# Patient Record
Sex: FEMALE | Race: WHITE | NOT HISPANIC OR LATINO | URBAN - METROPOLITAN AREA
[De-identification: names, ages, dates, MRNs, and addresses within clinical notes are randomized per-mention and may not be internally consistent; named-entity substitution may affect disease eponyms.]

---

## 2020-10-30 ENCOUNTER — WEB ENCOUNTER (OUTPATIENT)
Dept: URBAN - METROPOLITAN AREA CLINIC 19 | Facility: CLINIC | Age: 65
End: 2020-10-30

## 2020-10-30 ENCOUNTER — TELEPHONE ENCOUNTER (OUTPATIENT)
Dept: URBAN - METROPOLITAN AREA CLINIC 19 | Facility: CLINIC | Age: 65
End: 2020-10-30

## 2020-10-30 ENCOUNTER — OFFICE VISIT (OUTPATIENT)
Dept: URBAN - METROPOLITAN AREA CLINIC 19 | Facility: CLINIC | Age: 65
End: 2020-10-30
Payer: MEDICARE

## 2020-10-30 DIAGNOSIS — R05 CHRONIC COUGH: ICD-10-CM

## 2020-10-30 DIAGNOSIS — K21.9 GASTROESOPHAGEAL REFLUX DISEASE WITHOUT ESOPHAGITIS: ICD-10-CM

## 2020-10-30 PROCEDURE — G9903 PT SCRN TBCO ID AS NON USER: HCPCS | Performed by: INTERNAL MEDICINE

## 2020-10-30 PROCEDURE — 99213 OFFICE O/P EST LOW 20 MIN: CPT | Performed by: INTERNAL MEDICINE

## 2020-10-30 PROCEDURE — 3017F COLORECTAL CA SCREEN DOC REV: CPT | Performed by: INTERNAL MEDICINE

## 2020-10-30 PROCEDURE — G8484 FLU IMMUNIZE NO ADMIN: HCPCS | Performed by: INTERNAL MEDICINE

## 2020-10-30 PROCEDURE — G8420 CALC BMI NORM PARAMETERS: HCPCS | Performed by: INTERNAL MEDICINE

## 2020-10-30 PROCEDURE — G8427 DOCREV CUR MEDS BY ELIG CLIN: HCPCS | Performed by: INTERNAL MEDICINE

## 2020-10-30 RX ORDER — DEXLANSOPRAZOLE 60 MG/1
1 CAPSULE CAPSULE, DELAYED RELEASE ORAL ONCE A DAY
Qty: 90 CAPSULE | Refills: 3 | OUTPATIENT
Start: 2020-10-30

## 2020-10-30 NOTE — HPI-TODAY'S VISIT:
10/31/18 (Dr. Noelle Christiansen):  64 yo  woman presents for follow up. She has history of chronic cough and has had pulmonary evaulations including bronchoscopy. She also has diagnoses of asthma and allergies. She has taken immunotherapy but has significant allergies. She also uses rescue inhalers. She has had persistent cough. She had EGD which was essentially negative and only mild inflammation noted on biopsies. No celiac disease. No H pylori. She also had negative colonoscopy at the same time as her EGD on 10/19/18. She has persistent coughing for 10 years. She has had mild reflux symptoms which have been helped with Dexilant but she had insurance problems with it. She has tried a number of PPIs including esomeprazole, lansoprazole, omeprazole, pantoprazole in the past. No weight loss. No hemopytsis. No rectal bleeding. No change in bowel habits. No anemia. No melena. No fever or chills. She has shortness of breath when she is coughing.  10/30/20:  Patient presents to me today for evaluation of chronic cough.  She states that she her cough gets worse after eating and lying down.  She additionally has wheezing and a burning in her chest along with the hacking cough.

## 2020-11-09 ENCOUNTER — OFFICE VISIT (OUTPATIENT)
Dept: URBAN - METROPOLITAN AREA LAB 2 | Facility: LAB | Age: 65
End: 2020-11-09

## 2021-07-19 ENCOUNTER — OFFICE VISIT (OUTPATIENT)
Dept: URBAN - METROPOLITAN AREA CLINIC 23 | Facility: CLINIC | Age: 66
End: 2021-07-19

## 2021-09-01 ENCOUNTER — OFFICE VISIT (OUTPATIENT)
Dept: URBAN - METROPOLITAN AREA TELEHEALTH 2 | Facility: TELEHEALTH | Age: 66
End: 2021-09-01
Payer: MEDICARE

## 2021-09-01 ENCOUNTER — TELEPHONE ENCOUNTER (OUTPATIENT)
Dept: URBAN - METROPOLITAN AREA CLINIC 12 | Facility: CLINIC | Age: 66
End: 2021-09-01

## 2021-09-01 DIAGNOSIS — Z83.71 FAMILY HISTORY OF COLON POLYPS: ICD-10-CM

## 2021-09-01 DIAGNOSIS — R05 COUGH: ICD-10-CM

## 2021-09-01 DIAGNOSIS — K21.9 GERD: ICD-10-CM

## 2021-09-01 PROBLEM — 429969003 FAMILY HISTORY OF POLYP OF COLON: Status: ACTIVE | Noted: 2021-09-01

## 2021-09-01 PROCEDURE — 99213 OFFICE O/P EST LOW 20 MIN: CPT | Performed by: INTERNAL MEDICINE

## 2021-09-01 RX ORDER — DEXLANSOPRAZOLE 60 MG/1
1 CAPSULE CAPSULE, DELAYED RELEASE ORAL ONCE A DAY
Qty: 90 CAPSULE | Refills: 3 | Status: ACTIVE | COMMUNITY
Start: 2020-10-30

## 2021-09-01 RX ORDER — DEXLANSOPRAZOLE 60 MG/1
1 CAPSULE CAPSULE, DELAYED RELEASE ORAL ONCE A DAY
Qty: 90 | Refills: 0 | OUTPATIENT
Start: 2021-09-01

## 2021-09-01 NOTE — HPI-TODAY'S VISIT:
67 yo female last seen by Dr. Christiansen in 2018 -she has been seen previously for ongoing cough- at that time she was given dexilant samples at the last visit.  was advised to address the asthma,  -had endoscopy in 2018 which showed minimal inflammation -seeing pulmonologist - is on   multiple inhalers ,states that it has not improved with this -states that the cough is worsening and is not managable  -when she eats she coughs, drinks tea triggers the cough, also worse at night -she recalls that she did try dexilant but not for a long and insurance may not have covered this  -she is having burning in the chest

## 2022-05-19 ENCOUNTER — LAB OUTSIDE AN ENCOUNTER (OUTPATIENT)
Dept: URBAN - METROPOLITAN AREA CLINIC 19 | Facility: CLINIC | Age: 67
End: 2022-05-19

## 2022-05-19 ENCOUNTER — OFFICE VISIT (OUTPATIENT)
Dept: URBAN - METROPOLITAN AREA CLINIC 19 | Facility: CLINIC | Age: 67
End: 2022-05-19
Payer: MEDICARE

## 2022-05-19 DIAGNOSIS — K21.9 GASTROESOPHAGEAL REFLUX DISEASE, UNSPECIFIED WHETHER ESOPHAGITIS PRESENT: ICD-10-CM

## 2022-05-19 DIAGNOSIS — Z12.11 ENCOUNTER FOR SCREENING COLONOSCOPY FOR NON-HIGH-RISK PATIENT: ICD-10-CM

## 2022-05-19 PROBLEM — 305058001: Status: ACTIVE | Noted: 2022-05-19

## 2022-05-19 PROCEDURE — 99204 OFFICE O/P NEW MOD 45 MIN: CPT | Performed by: STUDENT IN AN ORGANIZED HEALTH CARE EDUCATION/TRAINING PROGRAM

## 2022-05-19 RX ORDER — PANTOPRAZOLE SODIUM 40 MG/1
1 TABLET TABLET, DELAYED RELEASE ORAL ONCE A DAY
Qty: 30 TABLET | Refills: 3 | OUTPATIENT
Start: 2022-05-19

## 2022-05-19 RX ORDER — FAMOTIDINE 40 MG/1
1 TABLET AT BEDTIME TABLET, FILM COATED ORAL ONCE A DAY
Qty: 30 | Refills: 2 | OUTPATIENT
Start: 2022-05-19

## 2022-05-19 RX ORDER — DEXLANSOPRAZOLE 60 MG/1
1 CAPSULE CAPSULE, DELAYED RELEASE ORAL ONCE A DAY
Qty: 90 | Refills: 0 | Status: DISCONTINUED | COMMUNITY
Start: 2021-09-01

## 2022-05-19 RX ORDER — DEXLANSOPRAZOLE 60 MG/1
1 CAPSULE CAPSULE, DELAYED RELEASE ORAL ONCE A DAY
Qty: 90 CAPSULE | Refills: 3 | Status: DISCONTINUED | COMMUNITY
Start: 2020-10-30

## 2022-05-19 RX ORDER — SODIUM SULFATE, MAGNESIUM SULFATE, AND POTASSIUM CHLORIDE 17.75; 2.7; 2.25 G/1; G/1; G/1
12 TABLETS TABLET ORAL
Qty: 24 TABLETS | Refills: 0 | OUTPATIENT
Start: 2022-05-19 | End: 2022-05-20

## 2022-05-19 NOTE — HPI-TODAY'S VISIT:
The pt is a 68 yo  F who presents for concerns of chronic cough.  Has been to a pulmonologist, ENT.  Symptoms ongoing for Several years, typically worsening only after she moved to [facility] Symptoms  Epigastric pain, cough, nocturnal, coughing spasms, dexilant, burning pain, wakes up at night. Voice hoarseness. Medications tried Dexilant, omeprazole, protonix, lifesavers, regular and rescue inhalers.   Medications that helped possibly protonix H. pylori testing - via bx during EGD and it was negative. Prior endoscopy Dr. Noelle Christiansen in 2018 with recommended 5 year follow up.   Smoking hx Never Other recreational drugs None. FH of luminal GI cancers None.  Mom - lung cancer/was a smoker.  Getting  cardiac workup from Dr. Everton Joe/ stress test.

## 2022-09-06 ENCOUNTER — ERX REFILL RESPONSE (OUTPATIENT)
Dept: URBAN - METROPOLITAN AREA CLINIC 19 | Facility: CLINIC | Age: 67
End: 2022-09-06

## 2022-09-06 RX ORDER — FAMOTIDINE 40 MG/1
1 TABLET AT BEDTIME TABLET, FILM COATED ORAL ONCE A DAY
Qty: 30 | Refills: 2 | OUTPATIENT

## 2022-09-06 RX ORDER — FAMOTIDINE 40 MG/1
TAKE ONE TABLET BY MOUTH AT BEDTIME TABLET, FILM COATED ORAL
Qty: 30 TABLET | Refills: 2 | OUTPATIENT

## 2022-10-10 ENCOUNTER — ERX REFILL RESPONSE (OUTPATIENT)
Dept: URBAN - METROPOLITAN AREA CLINIC 19 | Facility: CLINIC | Age: 67
End: 2022-10-10

## 2022-10-10 RX ORDER — PANTOPRAZOLE SODIUM 40 MG/1
1 TABLET TABLET, DELAYED RELEASE ORAL ONCE A DAY
Qty: 30 TABLET | Refills: 3 | OUTPATIENT

## 2022-10-10 RX ORDER — PANTOPRAZOLE 40 MG/1
TAKE ONE TABLET BY MOUTH DAILY TABLET, DELAYED RELEASE ORAL
Qty: 30 TABLET | Refills: 0 | OUTPATIENT

## 2022-10-17 ENCOUNTER — TELEPHONE ENCOUNTER (OUTPATIENT)
Dept: URBAN - METROPOLITAN AREA CLINIC 19 | Facility: CLINIC | Age: 67
End: 2022-10-17

## 2022-10-18 PROBLEM — 235595009 GASTROESOPHAGEAL REFLUX DISEASE: Status: ACTIVE | Noted: 2022-05-19

## 2022-10-25 ENCOUNTER — TELEPHONE ENCOUNTER (OUTPATIENT)
Dept: URBAN - METROPOLITAN AREA CLINIC 19 | Facility: CLINIC | Age: 67
End: 2022-10-25

## 2022-10-25 ENCOUNTER — OFFICE VISIT (OUTPATIENT)
Dept: URBAN - METROPOLITAN AREA SURGERY CENTER 31 | Facility: SURGERY CENTER | Age: 67
End: 2022-10-25
Payer: MEDICARE

## 2022-10-25 ENCOUNTER — LAB OUTSIDE AN ENCOUNTER (OUTPATIENT)
Dept: URBAN - METROPOLITAN AREA CLINIC 19 | Facility: CLINIC | Age: 67
End: 2022-10-25

## 2022-10-25 ENCOUNTER — CLAIMS CREATED FROM THE CLAIM WINDOW (OUTPATIENT)
Dept: URBAN - METROPOLITAN AREA CLINIC 4 | Facility: CLINIC | Age: 67
End: 2022-10-25
Payer: MEDICARE

## 2022-10-25 DIAGNOSIS — R05.3 CHRONIC COUGH: ICD-10-CM

## 2022-10-25 DIAGNOSIS — K31.89 ACQUIRED DEFORMITY OF DUODENUM: ICD-10-CM

## 2022-10-25 DIAGNOSIS — K29.70 GASTRITIS, UNSPECIFIED, WITHOUT BLEEDING: ICD-10-CM

## 2022-10-25 DIAGNOSIS — K31.7 BENIGN GASTRIC POLYP: ICD-10-CM

## 2022-10-25 DIAGNOSIS — K22.89 DILATATION OF ESOPHAGUS: ICD-10-CM

## 2022-10-25 PROBLEM — 98641000119100: Status: ACTIVE | Noted: 2022-10-25

## 2022-10-25 PROCEDURE — 43239 EGD BIOPSY SINGLE/MULTIPLE: CPT | Performed by: STUDENT IN AN ORGANIZED HEALTH CARE EDUCATION/TRAINING PROGRAM

## 2022-10-25 PROCEDURE — 88312 SPECIAL STAINS GROUP 1: CPT | Performed by: PATHOLOGY

## 2022-10-25 PROCEDURE — G8907 PT DOC NO EVENTS ON DISCHARG: HCPCS | Performed by: STUDENT IN AN ORGANIZED HEALTH CARE EDUCATION/TRAINING PROGRAM

## 2022-10-25 PROCEDURE — 88305 TISSUE EXAM BY PATHOLOGIST: CPT | Performed by: PATHOLOGY

## 2022-10-25 RX ORDER — PANTOPRAZOLE 40 MG/1
TAKE ONE TABLET BY MOUTH DAILY TABLET, DELAYED RELEASE ORAL
Qty: 30 TABLET | Refills: 0 | Status: ACTIVE | COMMUNITY

## 2022-10-25 RX ORDER — FAMOTIDINE 40 MG/1
TAKE ONE TABLET BY MOUTH AT BEDTIME TABLET, FILM COATED ORAL
Qty: 30 TABLET | Refills: 2 | Status: ACTIVE | COMMUNITY

## 2022-10-28 ENCOUNTER — OFFICE VISIT (OUTPATIENT)
Dept: URBAN - METROPOLITAN AREA SURGERY CENTER 31 | Facility: SURGERY CENTER | Age: 67
End: 2022-10-28

## 2022-12-05 ENCOUNTER — ERX REFILL RESPONSE (OUTPATIENT)
Dept: URBAN - METROPOLITAN AREA CLINIC 19 | Facility: CLINIC | Age: 67
End: 2022-12-05

## 2022-12-05 RX ORDER — FAMOTIDINE 40 MG/1
TAKE ONE TABLET BY MOUTH AT BEDTIME TABLET, FILM COATED ORAL
Qty: 30 TABLET | Refills: 3 | OUTPATIENT

## 2022-12-05 RX ORDER — FAMOTIDINE 40 MG/1
TAKE ONE TABLET BY MOUTH AT BEDTIME TABLET, FILM COATED ORAL
Qty: 30 TABLET | Refills: 0 | OUTPATIENT

## 2022-12-12 ENCOUNTER — OFFICE VISIT (OUTPATIENT)
Dept: URBAN - METROPOLITAN AREA SURGERY CENTER 31 | Facility: SURGERY CENTER | Age: 67
End: 2022-12-12

## 2022-12-15 ENCOUNTER — CLAIMS CREATED FROM THE CLAIM WINDOW (OUTPATIENT)
Dept: URBAN - METROPOLITAN AREA CLINIC 19 | Facility: CLINIC | Age: 67
End: 2022-12-15
Payer: MEDICARE

## 2022-12-15 ENCOUNTER — DASHBOARD ENCOUNTERS (OUTPATIENT)
Age: 67
End: 2022-12-15

## 2022-12-15 VITALS
HEIGHT: 66 IN | DIASTOLIC BLOOD PRESSURE: 80 MMHG | SYSTOLIC BLOOD PRESSURE: 121 MMHG | BODY MASS INDEX: 23.95 KG/M2 | TEMPERATURE: 98.4 F | WEIGHT: 149 LBS

## 2022-12-15 DIAGNOSIS — K21.9 GASTROESOPHAGEAL REFLUX DISEASE WITHOUT ESOPHAGITIS: ICD-10-CM

## 2022-12-15 DIAGNOSIS — R05 CHRONIC COUGH: ICD-10-CM

## 2022-12-15 DIAGNOSIS — R05 COUGH: ICD-10-CM

## 2022-12-15 PROCEDURE — 99213 OFFICE O/P EST LOW 20 MIN: CPT | Performed by: STUDENT IN AN ORGANIZED HEALTH CARE EDUCATION/TRAINING PROGRAM

## 2022-12-15 RX ORDER — NORTRIPTYLINE HYDROCHLORIDE 25 MG/1
1 CAPSULE CAPSULE ORAL ONCE A DAY
Qty: 30 | Refills: 2 | OUTPATIENT
Start: 2022-12-15

## 2022-12-15 RX ORDER — PANTOPRAZOLE 40 MG/1
TAKE ONE TABLET BY MOUTH DAILY TABLET, DELAYED RELEASE ORAL
Qty: 30 TABLET | Refills: 0 | Status: ACTIVE | COMMUNITY

## 2022-12-15 RX ORDER — FAMOTIDINE 40 MG/1
TAKE ONE TABLET BY MOUTH AT BEDTIME TABLET, FILM COATED ORAL
Qty: 30 TABLET | Refills: 3 | Status: ACTIVE | COMMUNITY

## 2022-12-15 NOTE — HPI-TODAY'S VISIT:
12/15/2022: Gerry: The pt is a 68 yo F here for follow up.  Continues to have concerns for cough.  Significant amount of pyrosis, cough and chest discomfort with post-nasal drip.  Says she has asthma has been bad and she cannot breath because of the cough and burning pain.  We discussed the results from the procedure as she had trouble accessing her account online.  I also made sure she had a copy of the path report and the letter available for her via a hard copy.    We discussed that the findings on EGD  that was performed on 10/25/2022 did not show any esophageal histopathological abnormalities including any Barretts.  Biospies of the mucosal abnormalities that I noted, the white flecks did not turn out to be of any significance.  Gastric biospies showed chemical gastritis, likely from bile acid reflux.  The duodenum was normal.  She did not have any significant evidence of reflux disease on visual inspection.  She also has not manifested any degree of response to the usual reflux medications.  This points to the obvious conclusion that her disease is not reflux related.  Otherwise it would not be repeatedly resistent over several years to reflux medication without any response to it.  In addition her mainfestation is cough and post-nasal drip, which is also not a typical reflux symptom but a pulmonary symptom and at best a possible extra-intestinal manifestation of reflux, if supported by other evidence of reflux symptoms.  In her case, there is no other symptom other than cough to manifest it.    Of note, the patient also had a paroxysm of cough when waking up in recovery after her EGD procedure, due to her astham, and her not taking her inhaler.  This leads me to believe that her condition has more to do with pulmonary issues rather than GI issues and with each upper endoscopic investigation, she is exposing herself unnecessarily to respiratory complications for a futile investigation. We discussed pH monitoring and the patient told me that she would not be able to tolerate this test.   5/19/2022:  Gerry: The pt is a 68 yo  F who presents for concerns of chronic cough.  Has been to a pulmonologist, ENT.  Symptoms ongoing for Several years, typically worsening only after she moved to [facility] Symptoms  Epigastric pain, cough, nocturnal, coughing spasms, dexilant, burning pain, wakes up at night. Voice hoarseness. Medications tried Dexilant, omeprazole, protonix, lifesavers, regular and rescue inhalers.   Medications that helped possibly protonix H. pylori testing - via bx during EGD and it was negative. Prior endoscopy Dr. Noelle Christiansen in 2018 with recommended 5 year follow up.   Smoking hx Never Other recreational drugs None. FH of luminal GI cancers None.  Mom - lung cancer/was a smoker.  Getting  cardiac workup from Dr. Everton Joe/ stress test.

## 2022-12-30 ENCOUNTER — TELEPHONE ENCOUNTER (OUTPATIENT)
Dept: URBAN - METROPOLITAN AREA CLINIC 19 | Facility: CLINIC | Age: 67
End: 2022-12-30

## 2022-12-30 RX ORDER — FAMOTIDINE 40 MG/1
TAKE ONE TABLET BY MOUTH AT BEDTIME TABLET, FILM COATED ORAL TWICE A DAY
Qty: 60 | Refills: 3

## 2023-05-29 ENCOUNTER — ERX REFILL RESPONSE (OUTPATIENT)
Dept: URBAN - METROPOLITAN AREA CLINIC 19 | Facility: CLINIC | Age: 68
End: 2023-05-29

## 2023-05-29 RX ORDER — FAMOTIDINE 40 MG/1
TAKE ONE TABLET BY MOUTH TWICE A DAY (ONE DOSE AT BEDTIME) TABLET, FILM COATED ORAL TWICE A DAY
Qty: 60 TABLET | Refills: 0 | OUTPATIENT

## 2023-05-29 RX ORDER — FAMOTIDINE 40 MG/1
TAKE ONE TABLET BY MOUTH TWICE A DAY (ONE DOSE AT BEDTIME) TABLET, FILM COATED ORAL
Qty: 60 TABLET | Refills: 3 | OUTPATIENT

## 2023-10-17 ENCOUNTER — ERX REFILL RESPONSE (OUTPATIENT)
Dept: URBAN - METROPOLITAN AREA CLINIC 19 | Facility: CLINIC | Age: 68
End: 2023-10-17

## 2023-10-17 RX ORDER — FAMOTIDINE 40 MG/1
TAKE 1 TABLET BY MOUTH TWICE A DAY ( ONE DOSE AT BEDTIME ) TABLET, FILM COATED ORAL
Qty: 60 TABLET | Refills: 5 | OUTPATIENT

## 2023-10-17 RX ORDER — FAMOTIDINE 40 MG/1
TAKE 1 TABLET BY MOUTH TWICE A DAY ( ONE DOSE AT BEDTIME ) TABLET, FILM COATED ORAL
Qty: 60 TABLET | Refills: 0 | OUTPATIENT

## 2024-06-24 ENCOUNTER — ERX REFILL RESPONSE (OUTPATIENT)
Dept: URBAN - METROPOLITAN AREA CLINIC 19 | Facility: CLINIC | Age: 69
End: 2024-06-24

## 2024-06-24 RX ORDER — FAMOTIDINE 40 MG/1
TAKE 1 TABLET BY MOUTH TWICE A DAY ( ONE DOSE AT BEDTIME ) TABLET, FILM COATED ORAL
Qty: 60 TABLET | Refills: 5 | OUTPATIENT

## 2024-06-24 RX ORDER — FAMOTIDINE 40 MG/1
TAKE 1 TABLET BY MOUTH TWICE A DAY (ONE DOSE AT BEDTIME) TABLET, FILM COATED ORAL
Qty: 60 TABLET | Refills: 5 | OUTPATIENT

## 2024-09-19 ENCOUNTER — OFFICE VISIT (OUTPATIENT)
Dept: URBAN - METROPOLITAN AREA CLINIC 19 | Facility: CLINIC | Age: 69
End: 2024-09-19

## 2024-09-19 ENCOUNTER — OFFICE VISIT (OUTPATIENT)
Dept: URBAN - METROPOLITAN AREA CLINIC 19 | Facility: CLINIC | Age: 69
End: 2024-09-19
Payer: MEDICARE

## 2024-09-19 VITALS
TEMPERATURE: 98.1 F | OXYGEN SATURATION: 95 % | BODY MASS INDEX: 22.95 KG/M2 | SYSTOLIC BLOOD PRESSURE: 108 MMHG | WEIGHT: 142.8 LBS | HEIGHT: 66 IN | HEART RATE: 90 BPM | DIASTOLIC BLOOD PRESSURE: 72 MMHG

## 2024-09-19 DIAGNOSIS — R05.3 UNEXPLAINED CHRONIC COUGH: ICD-10-CM

## 2024-09-19 DIAGNOSIS — K59.09 CHRONIC CONSTIPATION: ICD-10-CM

## 2024-09-19 DIAGNOSIS — K21.9 GASTROESOPHAGEAL REFLUX IN INFANTS: ICD-10-CM

## 2024-09-19 PROCEDURE — 99204 OFFICE O/P NEW MOD 45 MIN: CPT | Performed by: STUDENT IN AN ORGANIZED HEALTH CARE EDUCATION/TRAINING PROGRAM

## 2024-09-19 RX ORDER — NORTRIPTYLINE HYDROCHLORIDE 25 MG/1
1 CAPSULE CAPSULE ORAL ONCE A DAY
Qty: 30 | Refills: 2 | Status: ON HOLD | COMMUNITY
Start: 2022-12-15

## 2024-09-19 RX ORDER — PANTOPRAZOLE 40 MG/1
TAKE ONE TABLET BY MOUTH DAILY TABLET, DELAYED RELEASE ORAL
Qty: 30 TABLET | Refills: 0 | Status: ON HOLD | COMMUNITY

## 2024-09-19 RX ORDER — FAMOTIDINE 40 MG/1
TAKE 1 TABLET BY MOUTH TWICE A DAY (ONE DOSE AT BEDTIME) TABLET, FILM COATED ORAL
Qty: 60 TABLET | Refills: 5 | Status: ACTIVE | COMMUNITY

## 2024-09-19 NOTE — HPI-OTHER HISTORIES
12/15/2022: Gerry: The pt is a 66 yo F here for follow up. Continues to have concerns for cough. Significant amount of pyrosis, cough and chest discomfort with post-nasal drip. Says she has asthma has been bad and she cannot breath because of the cough and burning pain. We discussed the results from the procedure as she had trouble accessing her account online. I also made sure she had a copy of the path report and the letter available for her via a hard copy.  We discussed that the findings on EGD that was performed on 10/25/2022 did not show any esophageal histopathological abnormalities including any Barretts. Biospies of the mucosal abnormalities that I noted, the white flecks did not turn out to be of any significance. Gastric biospies showed chemical gastritis, likely from bile acid reflux. The duodenum was normal. She did not have any significant evidence of reflux disease on visual inspection. She also has not manifested any degree of response to the usual reflux medications. This points to the obvious conclusion that her disease is not reflux related. Otherwise it would not be repeatedly resistent over several years to reflux medication without any response to it. In addition her mainfestation is cough and post-nasal drip, which is also not a typical reflux symptom but a pulmonary symptom and at best a possible extra-intestinal manifestation of reflux, if supported by other evidence of reflux symptoms. In her case, there is no other symptom other than cough to manifest it. Of note, the patient also had a paroxysm of cough when waking up in recovery after her EGD procedure, due to her astham, and her not taking her inhaler. This leads me to believe that her condition has more to do with pulmonary issues rather than GI issues and with each upper endoscopic investigation, she is exposing herself unnecessarily to respiratory complications for a futile investigation. We discussed pH monitoring and the patient told me that she would not be able to tolerate this test.   5/19/2022: eGrry: The pt is a 66 yo F who presents for concerns of chronic cough. Has been to a pulmonologist, ENT.  Symptoms ongoing for Several years, typically worsening only after she moved to [facility] Symptoms Epigastric pain, cough, nocturnal, coughing spasms, dexilant, burning pain, wakes up at night. Voice hoarseness. Medications tried Dexilant, omeprazole, protonix, lifesavers, regular and rescue inhalers. Medications that helped possibly protonix H. pylori testing - via bx during EGD and it was negative. Prior endoscopy Dr. Noelle Christiansen in 2018 with recommended 5 year follow up. Smoking hx Never Other recreational drugs None. FH of luminal GI cancers None. Mom - lung cancer/was a smoker.  Getting cardiac workup from Dr. Everton Joe/ stress test.

## 2024-09-19 NOTE — HPI-TODAY'S VISIT:
9/19/2024:  Gerry: 70 yo F with chronic cough and post-nasal drip, with asthma, who was previously seen for these symptoms and underwent EGD in Dec 2022 with paroxysmal coughing noted in PACU after procedure is being seen for the following concerns. Left sided pain and bloating.  Abdominal X-ray was ordered by her PCP.  Findings include gas/stool in the colon and rectum.  Pelvic pleboliths.  Also noted was soft tissue density right mid abdomen.  Recommended sonogram or CT to rule out soft tissue mass.  It has been scheduled. Constipation.  Not taking anything regularly.  Still has cough.  Has been on omeprazole recently along with famotidine.  PPI and H2B has not helped in the past.  Has recently started seeing Dr. Adalgisa Draper.

## 2024-09-23 ENCOUNTER — LAB OUTSIDE AN ENCOUNTER (OUTPATIENT)
Dept: URBAN - METROPOLITAN AREA CLINIC 19 | Facility: CLINIC | Age: 69
End: 2024-09-23

## 2024-09-24 LAB
CREATININE POC: 0.7
PERFORMING LAB: (no result)

## 2024-09-30 ENCOUNTER — TELEPHONE ENCOUNTER (OUTPATIENT)
Dept: URBAN - METROPOLITAN AREA CLINIC 23 | Facility: CLINIC | Age: 69
End: 2024-09-30

## 2024-11-01 ENCOUNTER — OFFICE VISIT (OUTPATIENT)
Dept: URBAN - METROPOLITAN AREA SURGERY CENTER 31 | Facility: SURGERY CENTER | Age: 69
End: 2024-11-01

## 2024-11-12 ENCOUNTER — TELEPHONE ENCOUNTER (OUTPATIENT)
Dept: URBAN - METROPOLITAN AREA CLINIC 19 | Facility: CLINIC | Age: 69
End: 2024-11-12

## 2024-11-15 ENCOUNTER — OFFICE VISIT (OUTPATIENT)
Dept: URBAN - METROPOLITAN AREA SURGERY CENTER 31 | Facility: SURGERY CENTER | Age: 69
End: 2024-11-15
Payer: MEDICARE

## 2024-11-15 ENCOUNTER — CLAIMS CREATED FROM THE CLAIM WINDOW (OUTPATIENT)
Dept: URBAN - METROPOLITAN AREA CLINIC 4 | Facility: CLINIC | Age: 69
End: 2024-11-15
Payer: MEDICARE

## 2024-11-15 DIAGNOSIS — K62.3 RECTAL PROLAPSE: ICD-10-CM

## 2024-11-15 DIAGNOSIS — K31.89 OTHER DISEASES OF STOMACH AND DUODENUM: ICD-10-CM

## 2024-11-15 DIAGNOSIS — K62.6 ANAL ULCERATION: ICD-10-CM

## 2024-11-15 DIAGNOSIS — K62.6 ANAL ULCER: ICD-10-CM

## 2024-11-15 DIAGNOSIS — K21.9 ACID REFLUX: ICD-10-CM

## 2024-11-15 DIAGNOSIS — K21.9 GASTRO-ESOPHAGEAL REFLUX DISEASE WITHOUT ESOPHAGITIS: ICD-10-CM

## 2024-11-15 DIAGNOSIS — R05.3 CHRONIC COUGH: ICD-10-CM

## 2024-11-15 DIAGNOSIS — K59.09 OTHER CONSTIPATION: ICD-10-CM

## 2024-11-15 DIAGNOSIS — K62.6 ULCER OF ANUS AND RECTUM: ICD-10-CM

## 2024-11-15 DIAGNOSIS — K64.8 OTHER HEMORRHOIDS: ICD-10-CM

## 2024-11-15 PROCEDURE — 88305 TISSUE EXAM BY PATHOLOGIST: CPT | Performed by: PATHOLOGY

## 2024-11-15 PROCEDURE — 43239 EGD BIOPSY SINGLE/MULTIPLE: CPT | Performed by: STUDENT IN AN ORGANIZED HEALTH CARE EDUCATION/TRAINING PROGRAM

## 2024-11-15 PROCEDURE — 45380 COLONOSCOPY AND BIOPSY: CPT | Performed by: STUDENT IN AN ORGANIZED HEALTH CARE EDUCATION/TRAINING PROGRAM

## 2024-11-15 PROCEDURE — 00813 ANES UPR LWR GI NDSC PX: CPT | Performed by: NURSE ANESTHETIST, CERTIFIED REGISTERED

## 2024-11-15 RX ORDER — FAMOTIDINE 40 MG/1
TAKE 1 TABLET BY MOUTH TWICE A DAY (ONE DOSE AT BEDTIME) TABLET, FILM COATED ORAL
Qty: 60 TABLET | Refills: 5 | Status: ACTIVE | COMMUNITY

## 2024-11-15 RX ORDER — PANTOPRAZOLE 40 MG/1
TAKE ONE TABLET BY MOUTH DAILY TABLET, DELAYED RELEASE ORAL
Qty: 30 TABLET | Refills: 0 | Status: ON HOLD | COMMUNITY

## 2024-11-15 RX ORDER — NORTRIPTYLINE HYDROCHLORIDE 25 MG/1
1 CAPSULE CAPSULE ORAL ONCE A DAY
Qty: 30 | Refills: 2 | Status: ON HOLD | COMMUNITY
Start: 2022-12-15

## 2025-01-30 ENCOUNTER — OFFICE VISIT (OUTPATIENT)
Dept: URBAN - METROPOLITAN AREA CLINIC 19 | Facility: CLINIC | Age: 70
End: 2025-01-30
Payer: MEDICARE

## 2025-01-30 DIAGNOSIS — K59.09 CHRONIC CONSTIPATION: ICD-10-CM

## 2025-01-30 DIAGNOSIS — K20.90 ESOPHAGITIS: ICD-10-CM

## 2025-01-30 DIAGNOSIS — R05.3 UNEXPLAINED CHRONIC COUGH: ICD-10-CM

## 2025-01-30 DIAGNOSIS — K21.9 GASTROESOPHAGEAL REFLUX IN INFANTS: ICD-10-CM

## 2025-01-30 PROBLEM — 236069009: Status: ACTIVE | Noted: 2025-01-30

## 2025-01-30 PROBLEM — 1204369007: Status: ACTIVE | Noted: 2025-01-30

## 2025-01-30 PROBLEM — 16761005: Status: ACTIVE | Noted: 2025-01-30

## 2025-01-30 PROCEDURE — 99213 OFFICE O/P EST LOW 20 MIN: CPT | Performed by: STUDENT IN AN ORGANIZED HEALTH CARE EDUCATION/TRAINING PROGRAM

## 2025-01-30 RX ORDER — PANTOPRAZOLE 40 MG/1
TAKE ONE TABLET BY MOUTH DAILY TABLET, DELAYED RELEASE ORAL
Qty: 30 TABLET | Refills: 0 | Status: ACTIVE | COMMUNITY

## 2025-01-30 RX ORDER — NORTRIPTYLINE HYDROCHLORIDE 25 MG/1
1 CAPSULE CAPSULE ORAL ONCE A DAY
Qty: 30 | Refills: 2 | Status: ON HOLD | COMMUNITY
Start: 2022-12-15

## 2025-01-30 RX ORDER — FAMOTIDINE 40 MG/1
TAKE 1 TABLET BY MOUTH TWICE A DAY (ONE DOSE AT BEDTIME) TABLET, FILM COATED ORAL
Qty: 60 TABLET | Refills: 5 | Status: ACTIVE | COMMUNITY

## 2025-01-30 NOTE — HPI-TODAY'S VISIT:
1/30/2025:  Gerry: 70 yo F with chronic constipation and GERD symptoms, with chronic cough presents for follow up. Underwent EGD/Colonoscopy in Nov 2024.  Finding consistent with reflux and rectal prolapse with solitary rectal ulcer type changes from straining.  Here for follow up.  Not currently taking PPI which had previously been prescribed to her.  She was also recommended miralax for regular bowel regimen.

## 2025-01-30 NOTE — HPI-OTHER HISTORIES
11/15/2024:  Gerry: EGD- White specks noted in the esophagus. Biospied. Reflux typc changes noted.   Gastric erythema. Biopsied.  Reflux changes noted.  Hiatal hernia.  Normal duodenum. Colonoscopy- Hemorrhoids.  Rectal erythema.  Biopsied.  Solitary ulcer type changes noted with prolapse rectal mucosa.Otherwise normal exam. 5 year recall.  9/19/2024:  Gerry: 70 yo F with chronic cough and post-nasal drip, with asthma, who was previously seen for these symptoms and underwent EGD in Dec 2022 with paroxysmal coughing noted in PACU after procedure is being seen for the following concerns. Left sided pain and bloating.  Abdominal X-ray was ordered by her PCP.  Findings include gas/stool in the colon and rectum.  Pelvic pleboliths.  Also noted was soft tissue density right mid abdomen.  Recommended sonogram or CT to rule out soft tissue mass.  It has been scheduled. Constipation.  Not taking anything regularly.  Still has cough.  Has been on omeprazole recently along with famotidine.  PPI and H2B has not helped in the past.  Has recently started seeing Dr. Adalgisa Draper. Plan: Try voquezna 10 and EGD for cough.  Miralax for constipation.  Recommend colonoscopy.  2-day prep.  CTAP  ==================  12/15/2022: Gerry: The pt is a 66 yo F here for follow up. Continues to have concerns for cough. Significant amount of pyrosis, cough and chest discomfort with post-nasal drip. Says she has asthma has been bad and she cannot breath because of the cough and burning pain. We discussed the results from the procedure as she had trouble accessing her account online. I also made sure she had a copy of the path report and the letter available for her via a hard copy.  We discussed that the findings on EGD that was performed on 10/25/2022 did not show any esophageal histopathological abnormalities including any Barretts. Biospies of the mucosal abnormalities that I noted, the white flecks did not turn out to be of any significance. Gastric biospies showed chemical gastritis, likely from bile acid reflux. The duodenum was normal. She did not have any significant evidence of reflux disease on visual inspection. She also has not manifested any degree of response to the usual reflux medications. This points to the obvious conclusion that her disease is not reflux related. Otherwise it would not be repeatedly resistent over several years to reflux medication without any response to it. In addition her mainfestation is cough and post-nasal drip, which is also not a typical reflux symptom but a pulmonary symptom and at best a possible extra-intestinal manifestation of reflux, if supported by other evidence of reflux symptoms. In her case, there is no other symptom other than cough to manifest it. Of note, the patient also had a paroxysm of cough when waking up in recovery after her EGD procedure, due to her astham, and her not taking her inhaler. This leads me to believe that her condition has more to do with pulmonary issues rather than GI issues and with each upper endoscopic investigation, she is exposing herself unnecessarily to respiratory complications for a futile investigation. We discussed pH monitoring and the patient told me that she would not be able to tolerate this test.   5/19/2022: Gerry: The pt is a 66 yo F who presents for concerns of chronic cough. Has been to a pulmonologist, ENT.  Symptoms ongoing for Several years, typically worsening only after she moved to [facility] Symptoms Epigastric pain, cough, nocturnal, coughing spasms, dexilant, burning pain, wakes up at night. Voice hoarseness. Medications tried Dexilant, omeprazole, protonix, lifesavers, regular and rescue inhalers. Medications that helped possibly protonix H. pylori testing - via bx during EGD and it was negative. Prior endoscopy Dr. Noelle Christiansen in 2018 with recommended 5 year follow up. Smoking hx Never Other recreational drugs None. FH of luminal GI cancers None. Mom - lung cancer/was a smoker.  Getting cardiac workup from Dr. Everton Joe/ stress test.

## 2025-02-28 ENCOUNTER — TELEPHONE ENCOUNTER (OUTPATIENT)
Dept: URBAN - METROPOLITAN AREA CLINIC 19 | Facility: CLINIC | Age: 70
End: 2025-02-28

## 2025-02-28 ENCOUNTER — OFFICE VISIT (OUTPATIENT)
Dept: URBAN - METROPOLITAN AREA SURGERY CENTER 31 | Facility: SURGERY CENTER | Age: 70
End: 2025-02-28

## 2025-02-28 RX ORDER — FAMOTIDINE 40 MG/1
TAKE 1 TABLET BY MOUTH TWICE A DAY (ONE DOSE AT BEDTIME) TABLET, FILM COATED ORAL
Qty: 60 TABLET | Refills: 5 | Status: ACTIVE | COMMUNITY

## 2025-02-28 RX ORDER — PANTOPRAZOLE 40 MG/1
TAKE ONE TABLET BY MOUTH DAILY TABLET, DELAYED RELEASE ORAL
Qty: 30 TABLET | Refills: 0 | Status: ACTIVE | COMMUNITY

## 2025-02-28 RX ORDER — NORTRIPTYLINE HYDROCHLORIDE 25 MG/1
1 CAPSULE CAPSULE ORAL ONCE A DAY
Qty: 30 | Refills: 2 | Status: ON HOLD | COMMUNITY
Start: 2022-12-15

## 2025-03-07 ENCOUNTER — WEB ENCOUNTER (OUTPATIENT)
Dept: URBAN - METROPOLITAN AREA CLINIC 19 | Facility: CLINIC | Age: 70
End: 2025-03-07

## 2025-03-31 ENCOUNTER — TELEPHONE ENCOUNTER (OUTPATIENT)
Dept: URBAN - METROPOLITAN AREA CLINIC 19 | Facility: CLINIC | Age: 70
End: 2025-03-31

## 2025-04-03 ENCOUNTER — OFFICE VISIT (OUTPATIENT)
Dept: URBAN - METROPOLITAN AREA CLINIC 19 | Facility: CLINIC | Age: 70
End: 2025-04-03
Payer: MEDICARE

## 2025-04-03 DIAGNOSIS — R12 HEARTBURN: ICD-10-CM

## 2025-04-03 DIAGNOSIS — K20.90 ESOPHAGITIS: ICD-10-CM

## 2025-04-03 PROCEDURE — 99214 OFFICE O/P EST MOD 30 MIN: CPT

## 2025-04-03 RX ORDER — FAMOTIDINE 40 MG/1
1 TABLET TABLET, FILM COATED ORAL ONCE A DAY
Qty: 90 TABLET | Refills: 5

## 2025-04-03 RX ORDER — FAMOTIDINE 40 MG/1
TAKE 1 TABLET BY MOUTH TWICE A DAY (ONE DOSE AT BEDTIME) TABLET, FILM COATED ORAL
Qty: 60 TABLET | Refills: 5 | Status: ACTIVE | COMMUNITY

## 2025-04-03 RX ORDER — PANTOPRAZOLE 40 MG/1
TAKE ONE TABLET BY MOUTH DAILY TABLET, DELAYED RELEASE ORAL
Qty: 30 TABLET | Refills: 0 | Status: ACTIVE | COMMUNITY

## 2025-04-03 RX ORDER — OMEPRAZOLE 40 MG/1
1 CAPSULE 1/2 TO 1 HOUR BEFORE MORNING MEAL CAPSULE, DELAYED RELEASE ORAL ONCE A DAY
Qty: 90 | Refills: 3 | OUTPATIENT
Start: 2025-04-03

## 2025-04-03 NOTE — HPI-TODAY'S VISIT:
Mrs. Rai is a 69-year-old female with chronic constipation and GERD symptoms including heartburn and chronic cough presents today for follow-up.  Last seen in clinic by Dr. Garrett on 1/30/2025 and recommended to take PPI and famotidine once daily as well as repeating EGD for evaluation of esophagitis.  2/28/2025 EGD noted mild nonerosive and nonreflux esophagitis with no bleeding.  Small hiatal hernia, a few gastric polyps and a normal examined duodenum.  Proximal and distal esophageal biopsies with no evidence of Ballesteros's, EOE, dysplasia or malignancy.  Fundic gland polyp, negative for H. pylori.  3/27/2025 barium swallow noted a small sliding hiatal hernia, moderate spontaneous gastroesophageal reflux to the midesophagus and esophageal dysmotility.  Today she reports her heartburn is worse. Currently taking omeprazole 20 mg in the morning and pepcid at night time. She does drink a large amount of tea and eats chocolate daily.  Previous procedures: 11/2024 EGD noted white speckled mucosa in the esophagus, medium size hiatal hernia, erythematous mucosa in the antrum and normal examined duodenum On the same day colonoscopy noted hemorrhoids on perianal exam, erythematous mucosa in the rectum, otherwise normal exam.  Recommended repeating in 5 years (2029).

## 2025-05-15 ENCOUNTER — OFFICE VISIT (OUTPATIENT)
Dept: URBAN - METROPOLITAN AREA CLINIC 19 | Facility: CLINIC | Age: 70
End: 2025-05-15
Payer: MEDICARE

## 2025-05-15 DIAGNOSIS — R12 HEARTBURN: ICD-10-CM

## 2025-05-15 DIAGNOSIS — R19.4 CHANGE IN BOWEL HABITS: ICD-10-CM

## 2025-05-15 DIAGNOSIS — K20.90 ESOPHAGITIS: ICD-10-CM

## 2025-05-15 DIAGNOSIS — K62.5 RECTAL BLEED: ICD-10-CM

## 2025-05-15 DIAGNOSIS — K55.9 ISCHEMIC COLON: ICD-10-CM

## 2025-05-15 PROBLEM — 88111009: Status: ACTIVE | Noted: 2025-05-15

## 2025-05-15 PROBLEM — 12063002: Status: ACTIVE | Noted: 2025-05-15

## 2025-05-15 PROBLEM — 30588004: Status: ACTIVE | Noted: 2025-05-15

## 2025-05-15 PROCEDURE — 99214 OFFICE O/P EST MOD 30 MIN: CPT | Performed by: STUDENT IN AN ORGANIZED HEALTH CARE EDUCATION/TRAINING PROGRAM

## 2025-05-15 RX ORDER — OMEPRAZOLE 40 MG/1
1 CAPSULE 1/2 TO 1 HOUR BEFORE MORNING MEAL CAPSULE, DELAYED RELEASE ORAL ONCE A DAY
Qty: 90 | Refills: 3 | Status: ACTIVE | COMMUNITY
Start: 2025-04-03

## 2025-05-15 RX ORDER — PANTOPRAZOLE 40 MG/1
TAKE ONE TABLET BY MOUTH DAILY TABLET, DELAYED RELEASE ORAL
Qty: 30 TABLET | Refills: 0 | Status: ACTIVE | COMMUNITY

## 2025-05-15 RX ORDER — FAMOTIDINE 40 MG/1
1 TABLET TABLET, FILM COATED ORAL ONCE A DAY
Qty: 90 TABLET | Refills: 5 | Status: ACTIVE | COMMUNITY

## 2025-05-15 NOTE — HPI-TODAY'S VISIT:
Falguni Rai is a 70-year-old female who reports experiencing severe stomach pain and rectal bleeding [birth year]. She describes the pain as intense, accompanied by sweating and nausea, and eventually vomiting with blood present. The bleeding continued for three days, with bright red blood visible on tissue. She denies any episodes of dehydration or lightheadedness but mentions feeling tired. Falguni is concerned about a possible prolapsed hemorrhoid, noting a hole where excrement can come out. She recalls a previous colonoscopy six months ago and wonders if the bleeding could be a sign of cancer. She has been dealing with allergies and taking medications that may dry her out, potentially affecting blood circulation to her bowels. Falguni mentions her stool consistency varies with Miralax use, sometimes dark and dry, other times healthy and brown. She is advised to adjust her Miralax dosage to maintain regular bowel movements. Falguni is informed about the possibility of ischemic colitis due to transient drops in blood pressure affecting bowel blood supply. She is advised to undergo a blood test and colonoscopy to assess bowel damage and determine if antibiotics or other treatments are needed. Falguni is also referred to Dr. Gomez, a colorectal surgeon, for further evaluation of her hemorrhoid condition.

## 2025-05-15 NOTE — HPI-OTHER HISTORIES
4/3/2025: MARIN Zhang: Mrs. Rai is a 69-year-old female with chronic constipation and GERD symptoms including heartburn and chronic cough presents today for follow-up. Last seen in clinic by Dr. Garrett on 1/30/2025 and recommended to take PPI and famotidine once daily as well as repeating EGD for evaluation of esophagitis.  2/28/2025 EGD noted mild nonerosive and nonreflux esophagitis with no bleeding. Small hiatal hernia, a few gastric polyps and a normal examined duodenum. Proximal and distal esophageal biopsies with no evidence of Ballesteros's, EOE, dysplasia or malignancy. Fundic gland polyp, negative for H. pylori.  3/27/2025 barium swallow noted a small sliding hiatal hernia, moderate spontaneous gastroesophageal reflux to the midesophagus and esophageal dysmotility.  Today she reports her heartburn is worse. Currently taking omeprazole 20 mg in the morning and pepcid at night time. She does drink a large amount of tea and eats chocolate daily.  Previous procedures: 11/2024 EGD noted white speckled mucosa in the esophagus, medium size hiatal hernia, erythematous mucosa in the antrum and normal examined duodenum On the same day colonoscopy noted hemorrhoids on perianal exam, erythematous mucosa in the rectum, otherwise normal exam. Recommended repeating in 5 years (2029). Plan: Omeprazole

## 2025-05-16 PROBLEM — 266435005: Status: ACTIVE | Noted: 2025-05-16

## 2025-05-16 LAB
ABSOLUTE BASOPHILS: 60
ABSOLUTE EOSINOPHILS: 108
ABSOLUTE LYMPHOCYTES: 1614
ABSOLUTE MONOCYTES: 468
ABSOLUTE NEUTROPHILS: 3750
BASOPHILS: 1
EOSINOPHILS: 1.8
HEMATOCRIT: 42.4
HEMOGLOBIN: 13.7
LYMPHOCYTES: 26.9
MCH: 27.7
MCHC: 32.3
MCV: 85.7
MONOCYTES: 7.8
MPV: 8.9
NEUTROPHILS: 62.5
PLATELET COUNT: 307
RDW: 11.7
RED BLOOD CELL COUNT: 4.95
WHITE BLOOD CELL COUNT: 6

## 2025-06-05 ENCOUNTER — OFFICE VISIT (OUTPATIENT)
Dept: URBAN - METROPOLITAN AREA SURGERY CENTER 31 | Facility: SURGERY CENTER | Age: 70
End: 2025-06-05

## 2025-06-25 ENCOUNTER — WEB ENCOUNTER (OUTPATIENT)
Dept: URBAN - METROPOLITAN AREA CLINIC 19 | Facility: CLINIC | Age: 70
End: 2025-06-25

## 2025-06-29 ENCOUNTER — WEB ENCOUNTER (OUTPATIENT)
Dept: URBAN - METROPOLITAN AREA CLINIC 19 | Facility: CLINIC | Age: 70
End: 2025-06-29

## 2025-07-02 ENCOUNTER — LAB OUTSIDE AN ENCOUNTER (OUTPATIENT)
Dept: URBAN - METROPOLITAN AREA CLINIC 19 | Facility: CLINIC | Age: 70
End: 2025-07-02

## 2025-07-07 ENCOUNTER — WEB ENCOUNTER (OUTPATIENT)
Age: 70
End: 2025-07-07

## 2025-07-07 RX ORDER — FAMOTIDINE 40 MG/1
1 TABLET TABLET, FILM COATED ORAL ONCE A DAY
Qty: 30 TABLET | Refills: 5

## 2025-07-08 ENCOUNTER — TELEPHONE ENCOUNTER (OUTPATIENT)
Dept: URBAN - METROPOLITAN AREA CLINIC 19 | Facility: CLINIC | Age: 70
End: 2025-07-08

## 2025-07-10 ENCOUNTER — OFFICE VISIT (OUTPATIENT)
Dept: URBAN - METROPOLITAN AREA CLINIC 19 | Facility: CLINIC | Age: 70
End: 2025-07-10
Payer: MEDICARE

## 2025-07-10 DIAGNOSIS — K92.1 RECTAL BLEEDING: ICD-10-CM

## 2025-07-10 DIAGNOSIS — K62.3 RECTAL PROLAPSE: ICD-10-CM

## 2025-07-10 DIAGNOSIS — K21.9 GASTROESOPHAGEAL REFLUX DISEASE WITHOUT ESOPHAGITIS: ICD-10-CM

## 2025-07-10 DIAGNOSIS — K59.09 CHRONIC CONSTIPATION: ICD-10-CM

## 2025-07-10 PROCEDURE — 99213 OFFICE O/P EST LOW 20 MIN: CPT

## 2025-07-10 RX ORDER — OMEPRAZOLE 40 MG/1
1 CAPSULE 1/2 TO 1 HOUR BEFORE MORNING MEAL CAPSULE, DELAYED RELEASE ORAL ONCE A DAY
Qty: 90 | Refills: 3 | Status: DISCONTINUED | COMMUNITY
Start: 2025-04-03

## 2025-07-10 RX ORDER — PANTOPRAZOLE 40 MG/1
TAKE ONE TABLET BY MOUTH DAILY TABLET, DELAYED RELEASE ORAL
Qty: 30 TABLET | Refills: 0 | Status: DISCONTINUED | COMMUNITY

## 2025-07-10 RX ORDER — FAMOTIDINE 40 MG/1
1 TABLET TABLET, FILM COATED ORAL ONCE A DAY
Qty: 30 TABLET | Refills: 5 | Status: ACTIVE | COMMUNITY

## 2025-07-10 NOTE — HPI-TODAY'S VISIT:
Mrs. Rai is a 70-year-old female with PMH of chronic constipation and GERD presents today for follow-up and CT scan results. She was last seen in clinic by Dr. Garrett on 5/2025 for rectal bleeding, abdominal pain, constipation and and prolapsed rectum.  At that time she was referred to Dr. Patton and recommended a colonoscopy which is scheduled for 7/22/2025.  7/2/2025 CT A/P with contrast done to rule out stool blockage noted no acute process, mild hepatomegaly.  There is a benign cyst noted in the left kidney but not concerning and it does not need to be monitored per the radiologist.  A Bochdalek hernia (congenital variant). Mild hepatomegaly.   She is currently on famotidine 40 mg at nighttime. Recently stopped Omeprazole. Drinks tea and eats mints several times a day. She is managing constipation with Miralax but does not take it daily. Having a BM typically every 2 days. Has not seen CRS but will make an appt. She would like to hold off on having a colonoscopy as she has not had any more rectal bleeding. Says it was an isolated episode.  Previous GI workup: 11/2024 EGD noted white speckled mucosa in the esophagus, medium size hiatal hernia, erythematous mucosa in the antrum and normal examined duodenum On the same day colonoscopy noted hemorrhoids on perianal exam, erythematous mucosa in the rectum, otherwise normal exam. Recommended repeating in 5 years (2029). 2/28/2025 EGD noted mild nonerosive and nonreflux esophagitis with no bleeding. Small hiatal hernia, a few gastric polyps and a normal examined duodenum. Proximal and distal esophageal biopsies with no evidence of Ballesteros's, EOE, dysplasia or malignancy. Fundic gland polyp, negative for H. pylori. 3/27/2025 barium swallow noted a small sliding hiatal hernia, moderate spontaneous gastroesophageal reflux to the midesophagus and esophageal dysmotility.

## 2025-07-22 ENCOUNTER — OFFICE VISIT (OUTPATIENT)
Dept: URBAN - METROPOLITAN AREA SURGERY CENTER 31 | Facility: SURGERY CENTER | Age: 70
End: 2025-07-22